# Patient Record
Sex: MALE | Race: WHITE | NOT HISPANIC OR LATINO | Employment: FULL TIME | URBAN - METROPOLITAN AREA
[De-identification: names, ages, dates, MRNs, and addresses within clinical notes are randomized per-mention and may not be internally consistent; named-entity substitution may affect disease eponyms.]

---

## 2023-12-05 ENCOUNTER — OFFICE VISIT (OUTPATIENT)
Dept: BEHAVIORAL HEALTH | Facility: CLINIC | Age: 48
End: 2023-12-05
Payer: OTHER GOVERNMENT

## 2023-12-05 VITALS
HEIGHT: 69 IN | DIASTOLIC BLOOD PRESSURE: 91 MMHG | TEMPERATURE: 98.7 F | HEART RATE: 76 BPM | SYSTOLIC BLOOD PRESSURE: 118 MMHG | WEIGHT: 224.3 LBS | BODY MASS INDEX: 33.22 KG/M2 | RESPIRATION RATE: 16 BRPM

## 2023-12-05 DIAGNOSIS — G47.00 INSOMNIA, UNSPECIFIED TYPE: ICD-10-CM

## 2023-12-05 DIAGNOSIS — F90.0 ATTENTION DEFICIT HYPERACTIVITY DISORDER (ADHD), PREDOMINANTLY INATTENTIVE TYPE: ICD-10-CM

## 2023-12-05 DIAGNOSIS — F33.1 MODERATE EPISODE OF RECURRENT MAJOR DEPRESSIVE DISORDER (MULTI): ICD-10-CM

## 2023-12-05 PROCEDURE — 99214 OFFICE O/P EST MOD 30 MIN: CPT

## 2023-12-05 RX ORDER — DEXTROAMPHETAMINE SACCHARATE, AMPHETAMINE ASPARTATE MONOHYDRATE, DEXTROAMPHETAMINE SULFATE AND AMPHETAMINE SULFATE 3.75; 3.75; 3.75; 3.75 MG/1; MG/1; MG/1; MG/1
15 CAPSULE, EXTENDED RELEASE ORAL EVERY MORNING
Qty: 30 CAPSULE | Refills: 0 | Status: SHIPPED | OUTPATIENT
Start: 2023-12-23 | End: 2024-01-31 | Stop reason: WASHOUT

## 2023-12-05 RX ORDER — MECLIZINE HCL 12.5 MG 12.5 MG/1
TABLET ORAL
COMMUNITY
Start: 2023-01-20

## 2023-12-05 RX ORDER — SERTRALINE HYDROCHLORIDE 100 MG/1
100 TABLET, FILM COATED ORAL DAILY
Qty: 90 TABLET | Refills: 0 | Status: SHIPPED | OUTPATIENT
Start: 2023-12-05 | End: 2024-02-20 | Stop reason: WASHOUT

## 2023-12-05 RX ORDER — DEXTROAMPHETAMINE SACCHARATE, AMPHETAMINE ASPARTATE MONOHYDRATE, DEXTROAMPHETAMINE SULFATE AND AMPHETAMINE SULFATE 3.75; 3.75; 3.75; 3.75 MG/1; MG/1; MG/1; MG/1
15 CAPSULE, EXTENDED RELEASE ORAL DAILY
COMMUNITY
End: 2024-01-31 | Stop reason: WASHOUT

## 2023-12-05 RX ORDER — DEXTROAMPHETAMINE SACCHARATE, AMPHETAMINE ASPARTATE, DEXTROAMPHETAMINE SULFATE AND AMPHETAMINE SULFATE 1.25; 1.25; 1.25; 1.25 MG/1; MG/1; MG/1; MG/1
1 TABLET ORAL DAILY PRN
COMMUNITY
End: 2024-01-31 | Stop reason: WASHOUT

## 2023-12-05 RX ORDER — DEXTROAMPHETAMINE SACCHARATE, AMPHETAMINE ASPARTATE, DEXTROAMPHETAMINE SULFATE AND AMPHETAMINE SULFATE 1.25; 1.25; 1.25; 1.25 MG/1; MG/1; MG/1; MG/1
5 TABLET ORAL DAILY
Qty: 30 TABLET | Refills: 0 | Status: SHIPPED | OUTPATIENT
Start: 2024-01-22 | End: 2024-01-31 | Stop reason: SDUPTHER

## 2023-12-05 RX ORDER — DEXTROAMPHETAMINE SACCHARATE, AMPHETAMINE ASPARTATE, DEXTROAMPHETAMINE SULFATE AND AMPHETAMINE SULFATE 1.25; 1.25; 1.25; 1.25 MG/1; MG/1; MG/1; MG/1
5 TABLET ORAL DAILY
Qty: 30 TABLET | Refills: 0 | Status: SHIPPED | OUTPATIENT
Start: 2023-12-23 | End: 2024-01-31 | Stop reason: WASHOUT

## 2023-12-05 RX ORDER — SERTRALINE HYDROCHLORIDE 50 MG/1
50 TABLET, FILM COATED ORAL DAILY
COMMUNITY
End: 2023-12-05 | Stop reason: SDUPTHER

## 2023-12-05 RX ORDER — DEXTROAMPHETAMINE SACCHARATE, AMPHETAMINE ASPARTATE MONOHYDRATE, DEXTROAMPHETAMINE SULFATE AND AMPHETAMINE SULFATE 3.75; 3.75; 3.75; 3.75 MG/1; MG/1; MG/1; MG/1
15 CAPSULE, EXTENDED RELEASE ORAL EVERY MORNING
Qty: 30 CAPSULE | Refills: 0 | Status: SHIPPED | OUTPATIENT
Start: 2024-01-22 | End: 2024-01-31 | Stop reason: SDUPTHER

## 2023-12-05 RX ORDER — LISINOPRIL AND HYDROCHLOROTHIAZIDE 10; 12.5 MG/1; MG/1
1 TABLET ORAL
COMMUNITY

## 2023-12-05 NOTE — PROGRESS NOTES
"Subjective   Patient ID: Gera Cox is a 48 y.o. male who presents for Follow-up Last visit with this writer on 9/5/23.     HPI  Patient reports he is doing well - \"Going good\" since last visit. + URI, improving. Work has slowed down during the holidays - taking time to work on professional development. 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 Patient does report challenging days at home when dealing with his son. Diastolic BP elevated today - consumed coffee prior to vitals. Will check again after visit. Patient monitors BP at home which has been WNL.     -- depression --  Mood: stable  - intermittent low mood/frustrated days in response to conflict at home   No anhedonia  stable appetite and weight   Working to improve diet and exercise regiment - daily walks and going to the gym.   Sleep: up and down, \"never good\".   + difficulty falling asleep   - room for improvement with sleep hygiene   Has completed AQUILES testing - no AQUILES reported.   exercise/activity plays a role in sleep per patient - no other variables reported.   Room for improvement with sleep hygiene. using melatonin with mixed results   Energy levels: adequate - no daily naps reported.   Safety: When asked directly, denies any SI/HI plan or intent. Denies any past suicide attempts or self-harm.      Anxiety:   Intermittent stressful/overwhelmed days   Restlessness/keyed up or on edge: endorses   Irritability: denies   Sleep disturbance: endorses   No tics/tremors/agitation reported   No panic attacks      PTSD:   2 overseas deployments.   Denies many symptoms of PTSD but does describe that deployments were very disruptive to his home life and made a significant impact on he and his sons relationship.      ADHD:   denies any remaining bothersome symptoms   Functioning well at work and home   Was briefly on Adderall ER 20mg - reduce dose d/t side effects   ADHD symptoms stable on current regiment - few symptoms of " hyperactivity noted      No cardaic history outside of HTN per patient   Monitoring BP at home. Readings are 120s/70-80s per patient   Per fitbit resting HR is in the 60s to 80s during daytime activity   no CP/SOB/palpitations   Meclizine for dizziness/vertigo - not using recently but was helpful. Only used for Jiujitsu/specific activities where dizziness is recognized.      ETOH: 2 beers twice per week.   MJ: infrequent edible use specifically for sleep - not helpful. no recent use.   2-3 cups of coffee in the morning, no afternoon/evening caffeine.   No illicit drug use reported        Objective   Physical Exam  Constitutional:       Appearance: Normal appearance.   Neurological:      Mental Status: He is alert and oriented to person, place, and time.       General Appearance: Well groomed, appropriate eye contact  Attitude/Behavior: Cooperative  Motor: No psychomotor agitation or retardation, no tremor or other abnormal movements  Speech: Normal rate, volume, prosody  Gait/Station: WFL - Within functional limits  Mood: Stable  Affect: Euthymic, full-range  Thought Process: Linear, goal directed  Thought Associations: No loosening of associations  Thought Content: Normal  Perception: No perceptual abnormalities noted  Sensorium: Alert and oriented to person, place, time and situation  Insight: Intact  Judgement: Intact  Cognition: Cognitively intact to conversational testing with respect to attention, orientation, fund of knowledge, recent and remote memory, and language    Lab Review:   not applicable    Assessment/Plan   Problem List Items Addressed This Visit             ICD-10-CM    Attention deficit hyperactivity disorder (ADHD), predominantly inattentive type F90.0    Moderate episode of recurrent major depressive disorder (CMS/HCC) F33.1    Insomnia G47.00     Gera Cox is a 47 year old male currently residing in [place], [place] with his two son (ages 14 and 12). Previously ;  in  2012. He is being seen as a transfer patient from Dr. Rios - last apt. on 7/19/22. Pphx: depression and ADHD (inattentive type). Diagnosed in adulthood by clinical exam and questionnaire.     DSM-5 Diagnosis   ADHD - predominantly inattentive   MDD, recurrent      Current Medications   Sertraline 50mg per day  Adderall ER 15mg + 5mg booster - last filled on 11/25/23 for ER and 11/25/23 for IR   - Reviewed [place] on 12/5/23, no discrepancies or concerns noted.   - completed drug screen December 2022  + for cannabinoid, has used edible MJ in the past for sleep. No recent use.   - CSA completed December 2022      - Mood is overall stable with intermittent low mood/frustrated days   - unsuccessful trials of sleep aids reported, anxiety does appear to impact sleep volume and quality   - c/w Adderall ER 15mg + 5mg booster dose.  - increase Zoloft dose to 100mg daily to target anxiety and possibly improve ability to sleep   - encouraged to continue exercise regiment.  - ADHD stable on current regiment, functioning well at work. Infrequent MJ use reported.   - Provided link to [facility] with instructions   - encouraged patient to communicate any questions, concerns or side effects if recognized. Follow up appointment in 2 months or sooner if needed.   - patient is aware this provider is leaving  March 2024     Past Medications trials  Melatonin ER   Adderall ER  Trintellix  Lexapro  Wellbutrin  Clonidine  hydroxyzine     Start time 10:05am   End time 10:32am   Prep/charting time 5min  Total time 32min

## 2023-12-05 NOTE — PATIENT INSTRUCTIONS
Continue Adderall XR 15mg + 5mg booster dose   Increase Zoloft dose to 100mg daily   Follow up visit in 2 months or sooner if needed

## 2024-01-31 ENCOUNTER — TELEPHONE (OUTPATIENT)
Dept: BEHAVIORAL HEALTH | Facility: CLINIC | Age: 49
End: 2024-01-31
Payer: OTHER GOVERNMENT

## 2024-01-31 DIAGNOSIS — F90.0 ATTENTION DEFICIT HYPERACTIVITY DISORDER (ADHD), PREDOMINANTLY INATTENTIVE TYPE: ICD-10-CM

## 2024-01-31 RX ORDER — DEXTROAMPHETAMINE SACCHARATE, AMPHETAMINE ASPARTATE MONOHYDRATE, DEXTROAMPHETAMINE SULFATE AND AMPHETAMINE SULFATE 3.75; 3.75; 3.75; 3.75 MG/1; MG/1; MG/1; MG/1
15 CAPSULE, EXTENDED RELEASE ORAL EVERY MORNING
Qty: 30 CAPSULE | Refills: 0 | Status: SHIPPED | OUTPATIENT
Start: 2024-01-31 | End: 2024-02-20 | Stop reason: WASHOUT

## 2024-01-31 RX ORDER — DEXTROAMPHETAMINE SACCHARATE, AMPHETAMINE ASPARTATE, DEXTROAMPHETAMINE SULFATE AND AMPHETAMINE SULFATE 1.25; 1.25; 1.25; 1.25 MG/1; MG/1; MG/1; MG/1
5 TABLET ORAL DAILY
Qty: 30 TABLET | Refills: 0 | Status: SHIPPED | OUTPATIENT
Start: 2024-01-31 | End: 2024-02-20 | Stop reason: WASHOUT

## 2024-02-20 ENCOUNTER — TELEMEDICINE (OUTPATIENT)
Dept: BEHAVIORAL HEALTH | Facility: CLINIC | Age: 49
End: 2024-02-20
Payer: OTHER GOVERNMENT

## 2024-02-20 DIAGNOSIS — F33.1 MODERATE EPISODE OF RECURRENT MAJOR DEPRESSIVE DISORDER (MULTI): ICD-10-CM

## 2024-02-20 DIAGNOSIS — F90.0 ATTENTION DEFICIT HYPERACTIVITY DISORDER (ADHD), PREDOMINANTLY INATTENTIVE TYPE: ICD-10-CM

## 2024-02-20 DIAGNOSIS — F41.9 ANXIETY: ICD-10-CM

## 2024-02-20 PROCEDURE — 99212 OFFICE O/P EST SF 10 MIN: CPT

## 2024-02-20 RX ORDER — DEXTROAMPHETAMINE SACCHARATE, AMPHETAMINE ASPARTATE, DEXTROAMPHETAMINE SULFATE AND AMPHETAMINE SULFATE 1.25; 1.25; 1.25; 1.25 MG/1; MG/1; MG/1; MG/1
5 TABLET ORAL DAILY PRN
Qty: 30 TABLET | Refills: 0 | Status: SHIPPED | OUTPATIENT
Start: 2024-03-01 | End: 2024-03-31

## 2024-02-20 RX ORDER — DEXTROAMPHETAMINE SACCHARATE, AMPHETAMINE ASPARTATE, DEXTROAMPHETAMINE SULFATE AND AMPHETAMINE SULFATE 1.25; 1.25; 1.25; 1.25 MG/1; MG/1; MG/1; MG/1
5 TABLET ORAL DAILY PRN
Qty: 30 TABLET | Refills: 0 | Status: SHIPPED | OUTPATIENT
Start: 2024-04-28 | End: 2024-05-28

## 2024-02-20 RX ORDER — DEXTROAMPHETAMINE SACCHARATE, AMPHETAMINE ASPARTATE MONOHYDRATE, DEXTROAMPHETAMINE SULFATE AND AMPHETAMINE SULFATE 3.75; 3.75; 3.75; 3.75 MG/1; MG/1; MG/1; MG/1
15 CAPSULE, EXTENDED RELEASE ORAL EVERY MORNING
Qty: 30 CAPSULE | Refills: 0 | Status: SHIPPED | OUTPATIENT
Start: 2024-03-30 | End: 2024-04-29

## 2024-02-20 RX ORDER — DEXTROAMPHETAMINE SACCHARATE, AMPHETAMINE ASPARTATE, DEXTROAMPHETAMINE SULFATE AND AMPHETAMINE SULFATE 1.25; 1.25; 1.25; 1.25 MG/1; MG/1; MG/1; MG/1
5 TABLET ORAL DAILY PRN
Qty: 30 TABLET | Refills: 0 | Status: SHIPPED | OUTPATIENT
Start: 2024-03-30 | End: 2024-04-29

## 2024-02-20 RX ORDER — DEXTROAMPHETAMINE SACCHARATE, AMPHETAMINE ASPARTATE MONOHYDRATE, DEXTROAMPHETAMINE SULFATE AND AMPHETAMINE SULFATE 3.75; 3.75; 3.75; 3.75 MG/1; MG/1; MG/1; MG/1
15 CAPSULE, EXTENDED RELEASE ORAL EVERY MORNING
Qty: 30 CAPSULE | Refills: 0 | Status: SHIPPED | OUTPATIENT
Start: 2024-03-01 | End: 2024-03-31

## 2024-02-20 RX ORDER — DEXTROAMPHETAMINE SACCHARATE, AMPHETAMINE ASPARTATE MONOHYDRATE, DEXTROAMPHETAMINE SULFATE AND AMPHETAMINE SULFATE 3.75; 3.75; 3.75; 3.75 MG/1; MG/1; MG/1; MG/1
15 CAPSULE, EXTENDED RELEASE ORAL EVERY MORNING
Qty: 30 CAPSULE | Refills: 0 | Status: SHIPPED | OUTPATIENT
Start: 2024-04-28 | End: 2024-05-28

## 2024-02-20 NOTE — PROGRESS NOTES
"Subjective   Patient ID: Gera Cox is a 49 y.o. male who presents for Anxiety, Depression, ADHD, and Med Management Last visit with this writer on 12/5/24.     HPI  Patient arrived on-time for virtual appointment. He reports he is \"doing fine, no issues\". Home life described as \"good\". Looking forward to family vacation to [place] in march. Holidays were enjoyable. Recently started Testosterone therapy under medical care d/t low T levels - significant positive response reported. Rarely taking Zoloft now which patient wishes to stop. Sleep and daytime energy levels improving. Patient communicates \"mindset much much better\" , \"Mood much better\" since starting testosterone therapy.     -- depression --  Mood: Euthymic, \"much better\"   No anhedonia  stable appetite and weight   Sleep: \"little better\", 7hrs/night.   - room for improvement with sleep hygiene   Has completed AQUILES testing - no AQUILES reported.   Working out more often which has benefits energy levels and mood.  Energy levels: improving   Focus/concentration: not impaired.   Safety: When asked directly, denies any SI/HI plan or intent. Denies any past suicide attempts or self-harm.      Anxiety:   Stable    No panic attacks      ADHD:   denies any remaining bothersome symptoms   Functioning well at work and home   Was briefly on Adderall ER 20mg - reduce dose d/t side effects   ADHD symptoms stable on current regiment - few symptoms of hyperactivity noted      No cardaic history outside of HTN per patient   Monitoring BP at home. Readings are 120s/70-80s per patient   Per fitbit resting HR is in the 60s to 80s during daytime activity   no CP/SOB/palpitations   Meclizine for dizziness/vertigo - not using recently but was helpful. Only used for Jiujitsu/specific activities where dizziness is recognized.      ETOH: 2-4 drinks per week   MJ: infrequent edible use specifically for sleep reported - 1x/month. Patient agrees to stop completely after discussing " "potential impact on stimulant prescription.    2-3 cups of coffee in the morning, no afternoon/evening caffeine.   Tobacco - denies   No illicit drug use reported     Objective   Physical Exam  Constitutional:       Appearance: Normal appearance.   Neurological:      Mental Status: He is alert and oriented to person, place, and time.       General Appearance: Well groomed, appropriate eye contact  Attitude/Behavior: Cooperative  Motor: No psychomotor agitation or retardation, no tremor or other abnormal movements  Speech: Normal rate, volume, prosody  Gait/Station: WFL - Within functional limits  Mood: \"much better\"  Affect: Euthymic, full-range  Thought Process: Linear, goal directed  Thought Associations: No loosening of associations  Thought Content: Normal  Perception: No perceptual abnormalities noted  Sensorium: Alert and oriented to person, place, time and situation  Insight: Intact  Judgement: Intact  Cognition: Cognitively intact to conversational testing with respect to attention, orientation, fund of knowledge, recent and remote memory, and language    Lab Review:   not applicable    Assessment/Plan   Problem List Items Addressed This Visit             ICD-10-CM    Attention deficit hyperactivity disorder (ADHD), predominantly inattentive type F90.0    Moderate episode of recurrent major depressive disorder (CMS/HCC) F33.1    Anxiety F41.9   Gera Cox is a 47 year old male currently residing in [place], [place] with his two son (ages 14 and 12). Previously ;  in 2012. He is being seen as a transfer patient from Dr. Rios - michael jaffe on 7/19/22. Pphx: depression and ADHD (inattentive type). Diagnosed in adulthood by clinical exam and questionnaire.     DSM-5 Diagnosis   ADHD - predominantly inattentive   MDD, recurrent, in partial remission   Anxiety disorder, unspecified      Current Medications   Sertraline 100mg per day - self discontinued by patient   Adderall ER 15mg + 5mg " booster - last filled on 2/1/24 for ER and 2/1/24 for IR   - Reviewed [place] on 2/20/24, no discrepancies or concerns noted   - completed drug screen December 2022  + for cannabinoid, has used edible MJ in the past for sleep.   - CSA completed December 2022      - Mood improvements reported. No SI/HI  - Symptoms of anxiety stable  - Sleep and energy levels improving  - ADHD stable   - patient agrees to avoid all MJ products moving forward, using infrequently for sleep at this time   - c/w Adderall ER 15mg + IR 5mg booster dose.  - STOP Zoloft (patient already self discontinued   - encouraged to continue exercise regiment.  - patient is aware this provider is leaving  March 2024, he wishes to continue care with  Psychiatry  - patient is agreeable to plan detailed above.     Start time 10:15am   End time 10:27am   Prep/charting time 5min   Total time 17min

## 2024-02-20 NOTE — PATIENT INSTRUCTIONS
Continue Adderall ER 15mg and IR 5mg daily   STOP Zoloft   Please call  Psychiatry at [phone] to schedule visit with next provider.

## 2024-09-26 ENCOUNTER — APPOINTMENT (OUTPATIENT)
Dept: BEHAVIORAL HEALTH | Facility: CLINIC | Age: 49
End: 2024-09-26
Payer: OTHER GOVERNMENT